# Patient Record
Sex: MALE | Race: WHITE | Employment: FULL TIME | URBAN - METROPOLITAN AREA
[De-identification: names, ages, dates, MRNs, and addresses within clinical notes are randomized per-mention and may not be internally consistent; named-entity substitution may affect disease eponyms.]

---

## 2024-02-22 SDOH — HEALTH STABILITY: PHYSICAL HEALTH: ON AVERAGE, HOW MANY MINUTES DO YOU ENGAGE IN EXERCISE AT THIS LEVEL?: 60 MIN

## 2024-02-22 SDOH — HEALTH STABILITY: PHYSICAL HEALTH: ON AVERAGE, HOW MANY DAYS PER WEEK DO YOU ENGAGE IN MODERATE TO STRENUOUS EXERCISE (LIKE A BRISK WALK)?: 5 DAYS

## 2024-02-23 ENCOUNTER — OFFICE VISIT (OUTPATIENT)
Dept: INTERNAL MEDICINE CLINIC | Age: 24
End: 2024-02-23
Payer: COMMERCIAL

## 2024-02-23 VITALS
HEART RATE: 84 BPM | SYSTOLIC BLOOD PRESSURE: 126 MMHG | OXYGEN SATURATION: 100 % | HEIGHT: 70 IN | WEIGHT: 157.6 LBS | BODY MASS INDEX: 22.56 KG/M2 | DIASTOLIC BLOOD PRESSURE: 80 MMHG

## 2024-02-23 DIAGNOSIS — H91.92 DECREASED HEARING OF LEFT EAR: Primary | ICD-10-CM

## 2024-02-23 PROCEDURE — 99203 OFFICE O/P NEW LOW 30 MIN: CPT | Performed by: NURSE PRACTITIONER

## 2024-02-23 PROCEDURE — G8427 DOCREV CUR MEDS BY ELIG CLIN: HCPCS | Performed by: NURSE PRACTITIONER

## 2024-02-23 PROCEDURE — 4004F PT TOBACCO SCREEN RCVD TLK: CPT | Performed by: NURSE PRACTITIONER

## 2024-02-23 PROCEDURE — G8484 FLU IMMUNIZE NO ADMIN: HCPCS | Performed by: NURSE PRACTITIONER

## 2024-02-23 PROCEDURE — G8420 CALC BMI NORM PARAMETERS: HCPCS | Performed by: NURSE PRACTITIONER

## 2024-02-23 ASSESSMENT — PATIENT HEALTH QUESTIONNAIRE - PHQ9
2. FEELING DOWN, DEPRESSED OR HOPELESS: 0
1. LITTLE INTEREST OR PLEASURE IN DOING THINGS: 0
SUM OF ALL RESPONSES TO PHQ QUESTIONS 1-9: 0
SUM OF ALL RESPONSES TO PHQ9 QUESTIONS 1 & 2: 0
SUM OF ALL RESPONSES TO PHQ QUESTIONS 1-9: 0

## 2024-02-23 ASSESSMENT — ENCOUNTER SYMPTOMS
SORE THROAT: 0
RESPIRATORY NEGATIVE: 1
SINUS PRESSURE: 0
GASTROINTESTINAL NEGATIVE: 1

## 2024-02-23 NOTE — PROGRESS NOTES
Patient: Sean Carter is a 23 y.o. male who presents today with the following Chief Complaint(s):  Chief Complaint   Patient presents with    [facility]    Tinnitus       HPI  New patient.  Complaining of muffled hearing in his left ear.  Started within the last 7 days.  Had an ear pod in his ear most of the day and when he took it out noticed that he had decreased hearing.  Also had some ringing in his ear.  Symptoms lasted for 2 days.  Went to see an audiologist and his hearing exam was normal at that time.  Audiologist did recommend that patient follow-up with the ear nose throat specialist.  Denies any other medical history.          No current outpatient medications on file.     No current facility-administered medications for this visit.       Patient's past medical history, surgical history, family history, medications,  and allergies  were all reviewed and updated as appropriate today.    There is no problem list on file for this patient.    History reviewed. No pertinent past medical history.   History reviewed. No pertinent surgical history.    History reviewed. No pertinent family history.   Allergies   Allergen Reactions    Food      PEANUTS, CHOCOLATE   Parents state he can eats these foods just not a lot of them-6/13/13.         Review of Systems   Constitutional: Negative.    HENT:  Positive for hearing loss and tinnitus. Negative for congestion, ear discharge, ear pain, sinus pressure, sneezing and sore throat.    Respiratory: Negative.     Cardiovascular: Negative.    Gastrointestinal: Negative.    Genitourinary: Negative.    Musculoskeletal: Negative.    Skin: Negative.    Neurological:  Positive for headaches (occasional).   Psychiatric/Behavioral: Negative.         Vitals:    02/23/24 1148   BP: 126/80   Pulse: 84   SpO2: 100%   Weight: 71.5 kg (157 lb 9.6 oz)   Height: 1.765 m (5' 9.5\")     Physical Exam  Constitutional:       General: He is not in acute distress.     Appearance: Normal